# Patient Record
Sex: FEMALE | Race: WHITE | Employment: OTHER | URBAN - NONMETROPOLITAN AREA
[De-identification: names, ages, dates, MRNs, and addresses within clinical notes are randomized per-mention and may not be internally consistent; named-entity substitution may affect disease eponyms.]

---

## 2018-08-28 ENCOUNTER — OFFICE VISIT (OUTPATIENT)
Dept: ORTHOPEDIC SURGERY | Age: 63
End: 2018-08-28

## 2018-08-28 VITALS — HEIGHT: 71 IN | WEIGHT: 195 LBS | BODY MASS INDEX: 27.3 KG/M2

## 2018-08-28 DIAGNOSIS — M25.561 RIGHT KNEE PAIN, UNSPECIFIED CHRONICITY: Primary | ICD-10-CM

## 2018-08-28 DIAGNOSIS — M25.461 EFFUSION OF RIGHT KNEE: ICD-10-CM

## 2018-08-28 PROCEDURE — 20610 DRAIN/INJ JOINT/BURSA W/O US: CPT | Performed by: ORTHOPAEDIC SURGERY

## 2018-08-28 RX ORDER — DICLOFENAC SODIUM 75 MG/1
75 TABLET, DELAYED RELEASE ORAL 2 TIMES DAILY
Qty: 60 TABLET | Refills: 3 | Status: SHIPPED | OUTPATIENT
Start: 2018-08-28

## 2018-08-28 RX ORDER — LISINOPRIL 10 MG/1
TABLET ORAL
Refills: 0 | COMMUNITY
Start: 2018-07-17

## 2018-08-28 RX ORDER — MEDROXYPROGESTERONE ACETATE 2.5 MG/1
TABLET ORAL
Refills: 1 | COMMUNITY
Start: 2018-07-11

## 2018-08-28 RX ORDER — ESTRADIOL 0.5 MG/1
TABLET ORAL
Refills: 1 | COMMUNITY
Start: 2018-07-12

## 2018-08-28 NOTE — PROGRESS NOTES
Chief Complaint    Knee Pain (right knee)      History of Present Illness: Yeimy Goldberg is a 63 y.o. female. She is here for evaluation of her right knee. She's had right knee pain that is been going on now for roughly 6 weeks. She states the knee pain came on while she was swimming in a pool in holding her baby. She states that she was squatting and bouncing the baby in the full and she said ever since then she's had primarily medial sided right knee pain. It is painful with weightbearing. She's had some swelling within the knee. She has pain also at nighttime. She denies radicular pain. Denies numbness or tingling. Has had some improvement with the use of diclofenac. Medical History:  Patient's medications, allergies, past medical, surgical, social and family histories were reviewed and updated as appropriate. Review of Systems:  Pertinent items are noted in HPI  Review of systems reviewed from Patient History Form dated on 8/28/18 and available in the patient's chart under the Media tab. Vital Signs:  Ht 5' 11\" (1.803 m)   Wt 195 lb (88.5 kg)   BMI 27.20 kg/m²     General Exam:   Constitutional: Patient is adequately groomed with no evidence of malnutrition  DTRs: Deep tendon reflexes are intact  Mental Status: The patient is oriented to time, place and person. The patient's mood and affect are appropriate. Knee Examination:    Inspection:  No significant swelling erythema noted about the right knee today    Palpation:  There is tenderness palpation primarily along the medial joint line. Mild palpable effusion. No lateral joint line tenderness    Range of Motion:  0-130°    Strength:  Able to do a straight leg raise    Special Tests:  No instability to varus and valgus stress testing. Negative posterior drawer. There is pain reproduction with [place] exam    Skin: There are no rashes, ulcerations or lesions.     Gait: Walking with a normal gait      Additional Comments: Additional Examinations:         Left Lower Extremity: Examination of the left lower extremity does not show any tenderness, deformity or injury. Range of motion is unremarkable. There is no gross instability. There are no rashes, ulcerations or lesions. Strength and tone are normal.    Radiology:     X-rays obtained and reviewed in office:  Views 4 views of the right knee demonstrates no evidence of fracture or dislocation. She does have well-maintained joint spaces         Assessment :  Right knee concern for possible medial meniscus tear, mild medial compartmental arthritis    Impression:  Encounter Diagnoses   Name Primary?  Right knee pain, unspecified chronicity Yes    Effusion of right knee        Office Procedures:  Orders Placed This Encounter   Procedures    XR KNEE RIGHT (MIN 4 VIEWS)    MN METHYLPREDNISOLONE 40 MG INJ    MN ARTHROCENTESIS ASPIR&/INJ MAJOR JT/BURSA W/O US       Treatment Plan: We are going to try cortisone injection into the right knee today. She is agreeable with that plan. If she does not have improvement with cortisone I would then recommend getting an MRI to rule out medial meniscus tear. I did refill her diclofenac 75 mg that she can take up to once twice a day when she does have irritation of the knee. Under sterile conditions right knee was injected through superior lateral approach with 2 mL of 40 mg Depo-Medrol mixed with 3 mL quarter percent Marcaine. She did tolerate the injection well.   Postinjection precautions were given    No charge per Dr. Charlene Welch

## 2020-01-14 ENCOUNTER — HOSPITAL ENCOUNTER (OUTPATIENT)
Dept: CT IMAGING | Age: 65
Discharge: HOME | End: 2020-01-14
Payer: COMMERCIAL

## 2020-01-14 PROCEDURE — 75571 CT HRT W/O DYE W/CA TEST: CPT

## 2023-07-10 ENCOUNTER — TELEPHONE (OUTPATIENT)
Dept: ADMINISTRATIVE | Age: 68
End: 2023-07-10